# Patient Record
Sex: FEMALE | Race: WHITE | NOT HISPANIC OR LATINO | URBAN - METROPOLITAN AREA
[De-identification: names, ages, dates, MRNs, and addresses within clinical notes are randomized per-mention and may not be internally consistent; named-entity substitution may affect disease eponyms.]

---

## 2018-01-11 ENCOUNTER — CONVERSION ENCOUNTER (OUTPATIENT)
Dept: FAMILY MEDICINE CLINIC | Facility: CLINIC | Age: 48
End: 2018-01-11

## 2018-01-11 ENCOUNTER — OFFICE VISIT CONVERTED (OUTPATIENT)
Dept: FAMILY MEDICINE CLINIC | Facility: CLINIC | Age: 48
End: 2018-01-11
Attending: NURSE PRACTITIONER

## 2018-02-05 ENCOUNTER — CONVERSION ENCOUNTER (OUTPATIENT)
Dept: FAMILY MEDICINE CLINIC | Facility: CLINIC | Age: 48
End: 2018-02-05

## 2018-02-05 ENCOUNTER — OFFICE VISIT CONVERTED (OUTPATIENT)
Dept: FAMILY MEDICINE CLINIC | Facility: CLINIC | Age: 48
End: 2018-02-05
Attending: NURSE PRACTITIONER

## 2018-03-21 ENCOUNTER — OFFICE VISIT CONVERTED (OUTPATIENT)
Dept: FAMILY MEDICINE CLINIC | Facility: CLINIC | Age: 48
End: 2018-03-21
Attending: NURSE PRACTITIONER

## 2018-05-18 ENCOUNTER — OFFICE VISIT CONVERTED (OUTPATIENT)
Dept: FAMILY MEDICINE CLINIC | Facility: CLINIC | Age: 48
End: 2018-05-18
Attending: NURSE PRACTITIONER

## 2018-09-07 ENCOUNTER — OFFICE VISIT CONVERTED (OUTPATIENT)
Dept: FAMILY MEDICINE CLINIC | Facility: CLINIC | Age: 48
End: 2018-09-07
Attending: NURSE PRACTITIONER

## 2018-12-19 ENCOUNTER — CONVERSION ENCOUNTER (OUTPATIENT)
Dept: FAMILY MEDICINE CLINIC | Facility: CLINIC | Age: 48
End: 2018-12-19

## 2018-12-19 ENCOUNTER — OFFICE VISIT CONVERTED (OUTPATIENT)
Dept: FAMILY MEDICINE CLINIC | Facility: CLINIC | Age: 48
End: 2018-12-19
Attending: NURSE PRACTITIONER

## 2019-01-22 ENCOUNTER — OFFICE VISIT CONVERTED (OUTPATIENT)
Dept: FAMILY MEDICINE CLINIC | Facility: CLINIC | Age: 49
End: 2019-01-22
Attending: FAMILY MEDICINE

## 2019-01-22 ENCOUNTER — HOSPITAL ENCOUNTER (OUTPATIENT)
Dept: FAMILY MEDICINE CLINIC | Facility: CLINIC | Age: 49
Discharge: HOME | End: 2019-01-22

## 2019-01-22 LAB
FSH SERPL-ACNC: 6.2 M[IU]/ML
HCG SERPL-SCNC: NEGATIVE MMOL/L

## 2019-02-22 ENCOUNTER — OFFICE VISIT CONVERTED (OUTPATIENT)
Dept: FAMILY MEDICINE CLINIC | Facility: CLINIC | Age: 49
End: 2019-02-22
Attending: FAMILY MEDICINE

## 2019-05-17 ENCOUNTER — OFFICE VISIT CONVERTED (OUTPATIENT)
Dept: FAMILY MEDICINE CLINIC | Facility: CLINIC | Age: 49
End: 2019-05-17
Attending: FAMILY MEDICINE

## 2019-12-02 ENCOUNTER — OFFICE VISIT CONVERTED (OUTPATIENT)
Dept: FAMILY MEDICINE CLINIC | Facility: CLINIC | Age: 49
End: 2019-12-02
Attending: FAMILY MEDICINE

## 2019-12-02 ENCOUNTER — HOSPITAL ENCOUNTER (OUTPATIENT)
Dept: FAMILY MEDICINE CLINIC | Facility: CLINIC | Age: 49
Discharge: HOME | End: 2019-12-02
Attending: FAMILY MEDICINE

## 2019-12-04 LAB
AMOXICILLIN+CLAV SUSC ISLT: 8
AMPICILLIN SUSC ISLT: >=32
AMPICILLIN+SULBAC SUSC ISLT: 16
BACTERIA UR CULT: ABNORMAL
CEFAZOLIN SUSC ISLT: <=4
CEFEPIME SUSC ISLT: <=1
CEFTAZIDIME SUSC ISLT: <=1
CEFTRIAXONE SUSC ISLT: <=1
CEFUROXIME ORAL SUSC ISLT: 4
CEFUROXIME PARENTER SUSC ISLT: 4
CIPROFLOXACIN SUSC ISLT: >=4
ERTAPENEM SUSC ISLT: <=0.5
GENTAMICIN SUSC ISLT: >=16
LEVOFLOXACIN SUSC ISLT: >=8
NITROFURANTOIN SUSC ISLT: <=16
TETRACYCLINE SUSC ISLT: >=16
TMP SMX SUSC ISLT: >=320
TOBRAMYCIN SUSC ISLT: 8

## 2019-12-06 LAB
CONV ESTROGENS, TOTAL, SERUM: 193 PG/ML
FSH SERPL-ACNC: 7.7 M[IU]/ML
LH SERPL-ACNC: 5.1 M[IU]/ML
PROGEST SERPL-MCNC: 0.2 NG/ML

## 2020-07-27 ENCOUNTER — OFFICE VISIT CONVERTED (OUTPATIENT)
Dept: FAMILY MEDICINE CLINIC | Facility: CLINIC | Age: 50
End: 2020-07-27
Attending: FAMILY MEDICINE

## 2020-07-27 ENCOUNTER — HOSPITAL ENCOUNTER (OUTPATIENT)
Dept: FAMILY MEDICINE CLINIC | Facility: CLINIC | Age: 50
Discharge: HOME | End: 2020-07-27
Attending: FAMILY MEDICINE

## 2020-07-27 LAB
ALBUMIN SERPL-MCNC: 4.1 G/DL (ref 3.5–5)
ALBUMIN/GLOB SERPL: 1.4 {RATIO} (ref 1.4–2.6)
ALP SERPL-CCNC: 96 U/L (ref 42–98)
ALT SERPL-CCNC: 17 U/L (ref 10–40)
ANION GAP SERPL CALC-SCNC: 16 MMOL/L (ref 8–19)
AST SERPL-CCNC: 15 U/L (ref 15–50)
BASOPHILS # BLD AUTO: 0.03 10*3/UL (ref 0–0.2)
BASOPHILS # BLD: 0 % (ref 0–3)
BASOPHILS NFR BLD AUTO: 0.5 % (ref 0–3)
BILIRUB SERPL-MCNC: 0.18 MG/DL (ref 0.2–1.3)
BUN SERPL-MCNC: 14 MG/DL (ref 5–25)
BUN/CREAT SERPL: 16 {RATIO} (ref 6–20)
CALCIUM SERPL-MCNC: 9.2 MG/DL (ref 8.7–10.4)
CHLORIDE SERPL-SCNC: 100 MMOL/L (ref 99–111)
CHOLEST SERPL-MCNC: 214 MG/DL (ref 107–200)
CHOLEST/HDLC SERPL: 3.2 {RATIO} (ref 3–6)
CONV ABS BANDS: 0 % (ref 1–5)
CONV ABS IMM GRAN: 0.03 10*3/UL (ref 0–0.2)
CONV ATYPICAL LYMPHOCYTES: 6 % (ref 0–5)
CONV CO2: 26 MMOL/L (ref 22–32)
CONV IMMATURE GRAN: 0.5 % (ref 0–1.8)
CONV SEGMENTED NEUTROPHILS: 76 % (ref 45–70)
CONV TOTAL PROTEIN: 7 G/DL (ref 6.3–8.2)
CREAT UR-MCNC: 0.88 MG/DL (ref 0.5–0.9)
DEPRECATED RDW RBC AUTO: 42.6 FL (ref 36.4–46.3)
EOSINOPHIL # BLD AUTO: 0.04 10*3/UL (ref 0–0.7)
EOSINOPHIL # BLD AUTO: 0.7 % (ref 0–7)
EOSINOPHIL NFR BLD AUTO: 0 % (ref 0–7)
ERYTHROCYTE [DISTWIDTH] IN BLOOD BY AUTOMATED COUNT: 12.3 % (ref 11.7–14.4)
EST. AVERAGE GLUCOSE BLD GHB EST-MCNC: 97 MG/DL
GFR SERPLBLD BASED ON 1.73 SQ M-ARVRAT: >60 ML/MIN/{1.73_M2}
GLOBULIN UR ELPH-MCNC: 2.9 G/DL (ref 2–3.5)
GLUCOSE SERPL-MCNC: 94 MG/DL (ref 65–99)
HBA1C MFR BLD: 5 % (ref 3.5–5.7)
HCT VFR BLD AUTO: 38.7 % (ref 37–47)
HDLC SERPL-MCNC: 66 MG/DL (ref 40–60)
HGB BLD-MCNC: 12.2 G/DL (ref 12–16)
LDLC SERPL CALC-MCNC: 107 MG/DL (ref 70–100)
LYMPHOCYTES # BLD AUTO: 1.68 10*3/UL (ref 1–5)
LYMPHOCYTES NFR BLD AUTO: 27.9 % (ref 20–45)
MCH RBC QN AUTO: 30.2 PG (ref 27–31)
MCHC RBC AUTO-ENTMCNC: 31.5 G/DL (ref 33–37)
MCV RBC AUTO: 95.8 FL (ref 81–99)
MONOCYTES # BLD AUTO: 0.45 10*3/UL (ref 0.2–1.2)
MONOCYTES NFR BLD AUTO: 7.5 % (ref 3–10)
MONOCYTES NFR BLD MANUAL: 4 % (ref 3–10)
NEUTROPHILS # BLD AUTO: 3.79 10*3/UL (ref 2–8)
NEUTROPHILS NFR BLD AUTO: 62.9 % (ref 30–85)
NRBC CBCN: 0 % (ref 0–0.7)
NUC CELL # PRT MANUAL: 0 /100{WBCS}
OSMOLALITY SERPL CALC.SUM OF ELEC: 286 MOSM/KG (ref 273–304)
PLAT MORPH BLD: NORMAL
PLATELET # BLD AUTO: 215 10*3/UL (ref 130–400)
PMV BLD AUTO: 9.7 FL (ref 9.4–12.3)
POTASSIUM SERPL-SCNC: 4 MMOL/L (ref 3.5–5.3)
RBC # BLD AUTO: 4.04 10*6/UL (ref 4.2–5.4)
SMALL PLATELETS BLD QL SMEAR: ADEQUATE
SODIUM SERPL-SCNC: 138 MMOL/L (ref 135–147)
TRIGL SERPL-MCNC: 207 MG/DL (ref 40–150)
TSH SERPL-ACNC: 1.38 M[IU]/L (ref 0.27–4.2)
VARIANT LYMPHS NFR BLD MANUAL: 14 % (ref 20–45)
VLDLC SERPL-MCNC: 41 MG/DL (ref 5–37)
WBC # BLD AUTO: 6.02 10*3/UL (ref 4.8–10.8)

## 2021-05-07 NOTE — PROGRESS NOTES
"   Progress Note      Patient Name: Bria Montano   Patient ID: [id number]   Sex: Female   YOB: 1970        Visit Date: September 7, 2018    Provider: CHARLY Steel   Location: [facility]   Location Address: [address] [place] [address]  [id number]   Location Phone: [phone]          Chief Complaint     follow up on Iron  fatigue       History Of Present Illness  Bria Montano is a 48 year old /White female who presents for evaluation and treatment of:      follow up on labs from 3 months ago--she was noted to have a decreased RBC and her iron studies were low--she has been taking iron for the past 3 months--she needs to get her labs rechecked. She is still feeling fatigued, but not as bad as she was before. Her thyroid labs were okay in May. She wonders if some of the symptoms aren't related to pre-menopause. She is still having periods once per month, but \"they are going everywhere\". They seem to occur at the beginning, or at the end, or in between. They last about a week. The first 2 days are moderate, but not heavy, and then the other days are light.     She took some of the Lexapro, but she doesn't really like to take meds so she stopped it--she is using Plexus--things are better at work--things are getting better for her and --he is from [place] and he is trying to get permanent citizenship. She has worked out things with the friend that she was having trouble with as well.       Past Medical History  Disease Name Date Onset Notes   Anxiety --  --    Depression --  --    Seasonal allergies --  --          Past Surgical History  Procedure Name Date Notes   Sinus Surgery --  --    Tonsillectomy --  --          Medication List  Name Date Started Instructions   escitalopram oxalate 5 mg oral tablet 05/18/2018 take 1 tablet (5 mg) by oral route once daily   ferrous sulfate 325 mg (65 mg iron) oral tablet,delayed release (/EC) 05/29/2018 " take 1 tablet by oral route 2 times a day   montelukast 10 mg oral tablet 05/18/2018 take 1 tablet (10 mg) by oral route once daily in the evening         Allergy List  Allergen Name Date Reaction Notes   Antibiotic drug intolerance --  --  01/11/2018 - all antibiotics cause yeast infection   SULFA (SULFONAMIDES) --  --  --          Family Medical History  Disease Name Relative/Age Notes   Anemia / Mother    Mother/    Asthma / Mother    Mother/    Cerebrovascular Accident (CVA) / Mother    Mother/    Seizures / Sister    Sister/          Social History  Finding Status Start/Stop Quantity Notes   Alcohol Former --/-- --  drank alcohol in the past   Recreational Drug Use Never --/-- --  never used   Tobacco Never --/-- --  never smoker         Review of Systems  · Constitutional  o Admits  o : fatigue  · HENT  o Denies  o : headaches, vertigo, lightheadedness  · Cardiovascular  o Denies  o : chest pain, palpitations  · Respiratory  o Denies  o : cough  · Gastrointestinal  o Denies  o : nausea, vomiting, diarrhea, constipation, dysphagia, hematemesis, abdominal pain, blood in stools, hematochezia, melena  · Genitourinary  o Denies  o : hematuria, irregular menses, dysmenorrhea, menorrhagia  · Integument  o Denies  o : pigmentation changes  · Neurologic  o Denies  o : dizziness  · Endocrine  o Denies  o : cold intolerance, heat intolerance      Vitals  Date Time BP Position Site L\R Cuff Size HR RR TEMP(F) WT  HT  BMI kg/m2 BSA m2 O2 Sat HC       09/07/2018 09:16 /80 Sitting    95 - R  97.4 166lbs 0oz    99 %           Physical Examination  · Constitutional  o Appearance  o : well developed, well-nourished, in no acute distress  · Eyes  o Conjunctivae  o : conjunctivae normal, no exudates present  o Pupils and Irises  o : pupils equal and round, pupils reactive to light bilaterally  · Neck  o Inspection/Palpation  o : normal appearance, no masses or tenderness, trachea midline  o Thyroid  o : no  thyromegaly  · Respiratory  o Respiratory Effort  o : breathing unlabored  o Auscultation of Lungs  o : clear to ascultation  · Cardiovascular  o Heart  o :   § Auscultation of Heart  § : regular rate and rhythm  o Peripheral Vascular System  o :   § Extremities  § : no edema  · Gastrointestinal  o Abdominal Examination  o :   § Abdomen  § : soft, non-tender, non-distended, bowel sounds +  · Lymphatic  o Neck  o : no lymphadenopathy present  · Musculoskeletal  o General  o :   § General Musculoskeletal  § : Muscle tone, strength, and development grossly normal.  · Skin and Subcutaneous Tissue  o General Inspection  o : no lesions present, no areas of discoloration, skin turgor normal, texture normal  · Neurologic  o Gait and Station  o :   § Gait Screening  § : normal gait  · Psychiatric  o Mood and Affect  o : mood normal, affect appropriate              Assessment  · Fatigue     780.79/R53.83  · Iron deficiency     280.9/E61.1      Plan  · Orders  o Collection Fee for Venipuncture (96042) - - 09/07/2018  o CBC with Auto Diff [facility] (25830) - 280.9/E61.1 - 09/07/2018  o ACO-39: Current medications updated and reviewed () - - 09/07/2018  o Iron Profile (Iron 15890 TIBC 74914 and Transferrin 77131) (IRONP) - 280.9/E61.1 - 09/07/2018  o Ferritin ser/plas (29740) - 280.9/E61.1 - 09/07/2018  · Medications  o escitalopram oxalate 5 mg oral tablet   SIG: take 1 tablet (5 mg) by oral route once daily   DISP: (30) tablets with 0 refills  Discontinued on 09/07/2018     · Instructions  o Patient was educated/instructed on their diagnosis, treatment and medications prior to discharge from the clinic today. Her anxiety/depression are improved now that things at work are better--she does not want to continue any medication at this time. She is still fatigued--she did take the iron as prescribed--will recheck iron studies and if still low then we will refer to GI for further evaluation as she does not describe heavy menses.    · Disposition  o Call or Return if symptoms worsen or persist.            Electronically Signed by: CHARLY Steel -Author on September 7, 2018 09:43:22 AM

## 2021-05-07 NOTE — PROGRESS NOTES
Progress Note      Patient Name: Bria Deleon   Patient ID: [id number]   Sex: Female   YOB: 1970        Visit Date: December 2, 2019    Provider: Jonathon Moody DO   Location: [facility]   Location Address: [address] [place] [address] [place]  [id number]   Location Phone: [phone]          Chief Complaint     Refills and low back pain feels like UTI.       History Of Present Illness  Bria Deleon is a 49 year old /White female who presents for evaluation and treatment of:      1.) Depression/anxiety : The patient presents for follow up regarding anxiety and depression. She is currently prescribed Effexor and Buspar. She reports that she has not been taking the Buspar, as she felt as if the medication was not helping. She also attributes 'feeling cloudy' to the medication. She does admits to currently 'being very stressed', but is unclear regarding the source of her stress. Her PHQ-9 score is noted at 15.     2.) Dysuria : The patient presents with complaints of malodor of her urine. She denies any urinary frequency, burning with urination or hematuria. She does admit to back pain She denies fevers, chills. She denies pelvic pain.    3.) Perimenopause : The patient presents with complaints of what she assume are 'pre-menopausal sxs.' She reports being depressed, as well as poor concentration. She reports monthly menses, but then states that 'my period appears during different weeks.' She is unsure regarding the age of menopause of her mother. She 'thinks that her hormones are low' and is requesting hormone testing.       Past Medical History  Disease Name Date Onset Notes   Anxiety --  --    Depression --  --    Seasonal allergies --  --          Past Surgical History  Procedure Name Date Notes   Sinus Surgery --  --    Tonsillectomy --  --          Medication List  Name Date Started Instructions   biotin oral  --    buspirone 7.5 mg oral tablet 05/17/2019  take 1 tablet (7.5 mg) by oral route 2 times per day for 30 days   cetirizine 10 mg oral tablet  take 1 tablet (10 mg) by oral route once daily   Multivitamin 50 Plus oral tablet  take 1 tablet by oral route daily   venlafaxine 75 mg oral capsule,extended release 24hr 05/17/2019 take 1 capsule (75 mg) by oral route once daily with food for 30 days   vitamin B complex oral tablet  --    Vitamin C 1,000 mg oral tablet  take 1 tablet by oral route daily         Allergy List  Allergen Name Date Reaction Notes   Antibiotic drug intolerance --  --  01/11/2018 - all antibiotics cause yeast infection   SULFA (SULFONAMIDES) --  --  --          Family Medical History  Disease Name Relative/Age Notes   Anemia Mother/   Mother   Asthma Mother/   Mother   Seizures Sister/   Sister   Cerebrovascular Accident (CVA) Mother/   Mother         Social History  Finding Status Start/Stop Quantity Notes   Alcohol Former --/-- --  drank alcohol in the past   Recreational Drug Use Never --/-- --  never used   Tobacco Never --/-- --  never smoker         Review of Systems  · Constitutional  o Denies  o : fever, chills, night sweats  · Eyes  o Denies  o : eye pain, blurred vision  · HENT  o Denies  o : lightheadedness, nasal congestion  · Cardiovascular  o Denies  o : chest pain, syncope  · Respiratory  o Denies  o : shortness of breath, wheezing  · Gastrointestinal  o Denies  o : nausea, vomiting  · Genitourinary  o Admits  o : malodor of urine   o Denies  o : urgency, frequency, hematuria  · Integument  o Denies  o : rash, itching  · Neurologic  o Admits  o : difficulty concentrating  o Denies  o : altered mental status, tingling or numbness  · Musculoskeletal  o Admits  o : back pain  o Denies  o : muscular weakness, muscle cramps  · Endocrine  o Denies  o : thyroid disease, diabetes, heat or cold intolerance, excessive thirst or urination  · Psychiatric  o Admits  o : anxiety, depression  o Denies  o : hallucinations, delusions, feeling  confused  · Heme-Lymph  o Denies  o : petechiae, purpura      Vitals  Date Time BP Position Site L\R Cuff Size HR RR TEMP (F) WT  HT  BMI kg/m2 BSA m2 O2 Sat        12/02/2019 11:38 /78 Sitting    117 - R  97.4 187lbs 8oz    97 %          Physical Examination  · Constitutional  o Appearance  o : well developed, well-nourished, in no acute distress  · Head and Face  o HEENT  o : Unremarkable  · Eyes  o Conjunctivae  o : conjunctivae normal  o Pupils and Irises  o : pupils equal and round  · Neck  o Inspection/Palpation  o : supple  · Respiratory  o Respiratory Effort  o : breathing unlabored  o Auscultation of Lungs  o : clear to ascultation  · Cardiovascular  o Heart  o :   § Auscultation of Heart  § : regular rate and rhythm  o Peripheral Vascular System  o :   § Extremities  § : no edema  · Musculoskeletal  o General  o :   § General Musculoskeletal  § : No joint swelling or deformity.  · Skin and Subcutaneous Tissue  o General Inspection  o : no rashes present  · Neurologic  o Gait and Station  o :   § Gait Screening  § : normal gait  · Psychiatric  o Mood and Affect  o : mood normal, affect appropriate              Assessment  · Premenopausal patient     627.2/N95.9    A.) Hormone panel as noted below, will inform the patient of the results.     · Dysuria     788.1/R30.0    A.) See below.     · Depression     311/F32.9    A.) Will increase dose of Effexor, will discontinue Buspar as the patient has not been taking the medication.     · UTI (urinary tract infection)     599.0/N39.0    A.) UA + for nitrites, will start abx and will send for culture; if failed abx therapy, will refer to culture.         Plan  · Orders  o IOP - Urinalysis without Microscopy (Clinitek) [facility] (07423) - 788.1/R30.0 - 12/02/2019   GLU NEG SHELBI NEG KET NEG SG 1.020 BLO MODERATE PH 6.0 PRO NEG URO 0.2 NIT POSITIVE GELY NEG  o Urine culture (65284, 47863) - 788.1/R30.0 - 12/02/2019  o ACO-18: Positive screen for clinical depression  using a standardized tool and a follow-up plan documented () - 311/F32.9 - 12/02/2019   PHQ-9 = 15  o ACO-39: Current medications updated and reviewed () - - 12/02/2019  o Hormone Panel (Estrogen, FSH, LH, Progesterone) [facility] ([id number], 79764, 81840, 32917) - 627.2/N95.9 - 12/02/2019  · Medications  o Macrobid 100 mg oral capsule   SIG: take 1 capsule (100 mg) by oral route every 12 hours with food for 7 days   DISP: (14) capsules with 0 refills  Prescribed on 12/02/2019     o venlafaxine 150 mg oral tablet extended release 24hr   SIG: take 1 tablet (150 mg) by oral route once daily in the morning at the same time each day with food for 90 days   DISP: (90) tablets with 0 refills  Adjusted on 12/02/2019     o buspirone 7.5 mg oral tablet   SIG: take 1 tablet (7.5 mg) by oral route 2 times per day for 30 days   DISP: (60) tablets with 2 refills  Discontinued on 12/02/2019     o Medications have been Reconciled  o Transition of Care or Provider Policy  · Instructions  o Take all medications as prescribed/directed.  o Patient was educated/instructed on their diagnosis, treatment and medications prior to discharge from the clinic today.  o Return in 3 months.             Electronically Signed by: Jonathon Moody DO - on December 2, 2019 12:41:38 PM

## 2021-05-07 NOTE — PROGRESS NOTES
Progress Note      Patient Name: Bria Deleon   Patient ID: [id number]   Sex: Female   YOB: 1970        Visit Date: February 22, 2019    Provider: Arti Lima MD   Location: [facility]   Location Address: [address] [place] [address]  [id number]   Location Phone: [phone]          Chief Complaint     Follow up to discuss venlafaxine       History Of Present Illness  Bria Deleon is a 49 year old /White female who presents for evaluation and treatment of:      She thinks the medicine has helped considerably and she would like to increase the dose a little.  She is concerned about her  because he has a knot on his neck       Past Medical History  Disease Name Date Onset Notes   Anxiety --  --    Depression --  --    Seasonal allergies --  --          Past Surgical History  Procedure Name Date Notes   Sinus Surgery --  --    Tonsillectomy --  --          Medication List  Name Date Started Instructions   biotin oral  --    venlafaxine 75 mg oral capsule,extended release 24hr 01/22/2019 take 1 capsule (75 mg) by oral route once daily with food for 30 days   vitamin B complex oral tablet  --          Allergy List  Allergen Name Date Reaction Notes   Antibiotic drug intolerance --  --  01/11/2018 - all antibiotics cause yeast infection   SULFA (SULFONAMIDES) --  --  --          Family Medical History  Disease Name Relative/Age Notes   Anemia / Mother    Mother/    Asthma / Mother    Mother/    Cerebrovascular Accident (CVA) / Mother    Mother/    Seizures / Sister    Sister/          Social History  Finding Status Start/Stop Quantity Notes   Alcohol Former --/-- --  drank alcohol in the past   Recreational Drug Use Never --/-- --  never used   Tobacco Never --/-- --  never smoker         Vitals  Date Time BP Position Site L\R Cuff Size HR RR TEMP(F) WT  HT  BMI kg/m2 BSA m2 O2 Sat HC       02/22/2019 02:39 /78 Sitting    113 - R   98.4 173lbs 0oz    98 %           Physical Examination  · Constitutional  o Appearance  o : well developed, well-nourished, in no acute distress  · Eyes  o Conjunctivae  o : conjunctivae normal  · Neck  o Thyroid  o : no thyromegaly  · Respiratory  o Respiratory Effort  o : breathing unlabored  · Cardiovascular  o Heart  o :   § Auscultation of Heart  § : regular rate and rhythm  o Peripheral Vascular System  o :   § Extremities  § : no edema  · Lymphatic  o Neck  o : no lymphadenopathy present  · Musculoskeletal  o General  o :   § General Musculoskeletal  § : grossly normal.  · Skin and Subcutaneous Tissue  o General Inspection  o : normal  · Neurologic  o Gait and Station  o :   § Gait Screening  § : normal gait  · Psychiatric  o Mood and Affect  o : mood normal, affect appropriate she is smiling and looks good She is a little anxious about her           Assessment  · Anxiety disorder     300.00/F41.9  · Major depressive disorder     296.20/F32.2      Plan  · Orders  o ACO-39: Current medications updated and reviewed () - - 02/22/2019  · Medications  o venlafaxine 150 mg oral tablet extended release 24hr   SIG: take 1 tablet (150 mg) by oral route once daily in the morning at the same time each day with food for 30 days   DISP: (30) tablets with 3 refills  Adjusted on 02/22/2019     · Instructions  o Patient was educated/instructed on their diagnosis, treatment and medications prior to discharge from the clinic today.            Electronically Signed by: Arti Lima MD -Author on February 22, 2019 05:15:45 PM

## 2021-05-07 NOTE — PROGRESS NOTES
Progress Note      Patient Name: Bria Deleon   Patient ID: [id number]   Sex: Female   YOB: 1970        Visit Date: May 17, 2019    Provider: Arti Lima MD   Location: [facility]   Location Address: [address] [place] [address]  [id number]   Location Phone: [phone]          Chief Complaint     Discuss anxiety medication that she is currently taking  Patient wants to talk about starting a medication to help her focus       History Of Present Illness  Bria Deleon is a 49 year old /White female who presents for evaluation and treatment of:      She is getting more anxiety.  She is on Venlafaxine 75mg  and when we added the 37.5 she felt awful and tired.  She was on Buspar years ago.  She gets palpitations and has problems focusing.  She has had side effects if she doesn't take the Venlafaxine at the same time. She would like to try Adderall because her son does well on it.  She thinks she needs the smart pill.  Her problem with focusing is when she is doing bills, or at work.  She has always had trouble with focusing.  It has been a long time since she has seen a psychologist.  She does not have any history of heart problems.  She has anxiety.  She is planning a big wedding but she doesn't have anxiety in regards to that.  She does fear losing her .  She is happy now.  She does better since she started Plexis.  She had trouble in school .  She was hyperactive when she was little also.  She is overwhelmed.  She stays tired and sleeps a lot.  She can't stand the noise of the [employer] beeping when she goes backwords.  Noise sets her on edge. She realizes that she takes things too personally       Past Medical History  Disease Name Date Onset Notes   Anxiety --  --    Depression --  --    Seasonal allergies --  --          Past Surgical History  Procedure Name Date Notes   Sinus Surgery --  --    Tonsillectomy --  --          Medication  List  Name Date Started Instructions   biotin oral  --    venlafaxine 75 mg oral capsule,extended release 24hr 05/17/2019 take 1 capsule (75 mg) by oral route once daily with food for 30 days   vitamin B complex oral tablet  --          Allergy List  Allergen Name Date Reaction Notes   Antibiotic drug intolerance --  --  01/11/2018 - all antibiotics cause yeast infection   SULFA (SULFONAMIDES) --  --  --          Family Medical History  Disease Name Relative/Age Notes   Anemia Mother/   Mother   Asthma Mother/   Mother   Seizures Sister/   Sister   Cerebrovascular Accident (CVA) Mother/   Mother         Social History  Finding Status Start/Stop Quantity Notes   Alcohol Former --/-- --  drank alcohol in the past   Recreational Drug Use Never --/-- --  never used   Tobacco Never --/-- --  never smoker         Vitals  Date Time BP Position Site L\R Cuff Size HR RR TEMP (F) WT  HT  BMI kg/m2 BSA m2 O2 Sat HC       05/17/2019 04:33 /64 Sitting    111 - R  96 174lbs 4oz    96 %          Physical Examination  · Constitutional  o Appearance  o : well developed, well-nourished, in no acute distress  · Eyes  o Conjunctivae  o : conjunctivae normal  · Neck  o Thyroid  o : no thyromegaly  · Respiratory  o Respiratory Effort  o : breathing unlabored  o Auscultation of Lungs  o : clear to ascultation  · Cardiovascular  o Heart  o :   § Auscultation of Heart  § : regular rate and rhythm  o Peripheral Vascular System  o :   § Extremities  § : no edema  · Musculoskeletal  o General  o :   § General Musculoskeletal  § : Muscle tone, strength, and development grossly normal.  · Skin and Subcutaneous Tissue  o General Inspection  o : normal  · Neurologic  o Gait and Station  o :   § Gait Screening  § : normal gait  · Psychiatric  o Mood and Affect  o : fidgety but yawns occasionally.          Assessment  · Anxiety disorder     300.00/F41.9      Plan  · Orders  o ACO-39: Current medications updated and reviewed () - -  05/17/2019  · Medications  o buspirone 7.5 mg oral tablet   SIG: take 1 tablet (7.5 mg) by oral route 2 times per day for 30 days   DISP: (60) tablets with 3 refills  Prescribed on 05/17/2019     o venlafaxine 75 mg oral capsule,extended release 24hr   SIG: take 1 capsule (75 mg) by oral route once daily with food for 30 days   DISP: (30) capsules with 5 refills  Adjusted on 05/17/2019     · Instructions  o Patient was educated/instructed on their diagnosis, treatment and medications prior to discharge from the clinic today.            Electronically Signed by: Arti Lima MD -Author on May 17, 2019 05:20:50 PM

## 2021-05-07 NOTE — PROGRESS NOTES
Progress Note      Patient Name: Bria Montano   Patient ID: [id number]   Sex: Female   YOB: 1970        Visit Date: February 5, 2018    Provider: CHARLY Steel   Location: [facility]   Location Address: [address] [place] [address]  [id number]   Location Phone: [phone]          Chief Complaint     ITCHING AND BURNING WITH URINATION       History Of Present Illness  Bria Montano is a 48 year old /White female who presents for evaluation and treatment of:      vaginal itching and burning with urination. Has been ongoing for the past few days. Denies any vaginal discharge. No gross hematuria. No abdominal pain or lower back pain. No nausea, vomiting, or diarrhea. Denies fever or chills.       Past Medical History  Disease Name Date Onset Notes   Anxiety --  --    Depression --  --    Seasonal allergies --  --          Past Surgical History  Procedure Name Date Notes   Sinus Surgery --  --    Tonsillectomy --  --          Medication List  Name Date Started Instructions   Ventolin HFA 90 mcg/actuation inhalation HFA aerosol inhaler 01/11/2018 inhale 1 - 2 puffs (90 - 180 mcg) by inhalation route every 4-6 hours as needed for 10 days         Allergy List  Allergen Name Date Reaction Notes   Antibiotic drug intolerance --  --  01/11/2018 - all antibiotics cause yeast infection   SULFA (SULFONAMIDES) --  --  --          Family Medical History  Disease Name Relative/Age Notes   Anemia / Mother    Mother/    Asthma / Mother    Mother/    Cerebrovascular Accident (CVA) / Mother    Mother/    Seizures / Sister    Sister/          Social History  Finding Status Start/Stop Quantity Notes   Alcohol Former --/-- --  drank alcohol in the past   Recreational Drug Use Never --/-- --  never used   Tobacco Never --/-- --  never smoker         Review of Systems  · Constitutional  o Denies  o : fever, chills  · Cardiovascular  o Denies  o : chest pain, irregular  heart beats, syncope, dyspnea on exertion  · Respiratory  o Denies  o : shortness of breath, wheezing, cough  · Gastrointestinal  o Denies  o : nausea, vomiting, diarrhea, abdominal pain  · Genitourinary  o Admits  o : dysuria, vaginal itching  o Denies  o : urgency, frequency, hematuria, change in urine color, urinary retention, vaginal discharge  · Integument  o Denies  o : rash  · Psychiatric  o Denies  o : anxiety, depression, difficulty sleeping, suicidal ideation, homicidal ideation      Vitals  Date Time BP Position Site L\R Cuff Size HR RR TEMP(F) WT  HT  BMI kg/m2 BSA m2 O2 Sat HC       02/05/2018 01:01 /80 Sitting    125 - R  98.1 165lbs 0oz    100 %           Physical Examination  · Constitutional  o Appearance  o : well developed, well-nourished, in no acute distress  · Respiratory  o Respiratory Effort  o : breathing unlabored  o Auscultation of Lungs  o : clear to ascultation  · Cardiovascular  o Heart  o :   § Auscultation of Heart  § : regular rate and rhythm  o Peripheral Vascular System  o :   § Extremities  § : no edema  · Genitourinary  o External Genitalia  o : no inflammation, no lesions present, no masses present, no evidence of trauma  · Lymphatic  o Neck  o : no lymphadenopathy present  · Musculoskeletal  o General  o :   § General Musculoskeletal  § : No joint swelling or deformity., Muscle tone, strength, and development grossly normal.  · Skin and Subcutaneous Tissue  o General Inspection  o : no lesions present, no areas of discoloration, skin turgor normal, texture normal  · Neurologic  o Gait and Station  o :   § Gait Screening  § : normal gait  · Psychiatric  o Mood and Affect  o : mood normal, affect appropriate              Assessment  · Vaginal itching     698.1/L29.8  · Dysuria     788.1/R30.0      Plan  · Orders  o IOP - Urinalysis without Microscopy (Clinitek) [facility] ([id number]) - 788.1/R30.0 - 02/06/2018   glu neg, marilynn neg, ket neg, sg 1.015, blo neg, ph 7.5, pro neg, uro 0.2,  nit neg, adam trace  o ACO-39: Current medications updated and reviewed () - - 02/05/2018  o ACO-18: Screening for clinical depression not completed due to current diagnosis of depression or bipolar disorder () - - 02/05/2018  o Influenza immunization was not ordered or administered for reasons documented by clinician () - - 02/05/2018   Up to date; out of stock  o Vag Screen Infectious agent detection by nucleic acid DNA or RNA ([id number]) - 698.1/L29.8 - 02/05/2018  · Medications  o fluconazole 150 mg oral tablet   SIG: take 1 tablet (150 mg) by oral route once; may repeat in 3 days   DISP: (2) tablets with 0 refills  Prescribed on 02/05/2018     · Instructions  o Patient was educated/instructed on their diagnosis, treatment and medications prior to discharge from the clinic today. Her UA showed trace leukocytes, but was otherwise normal. Vaginal swab for yeast obtained--will also check for BV. Will call results, but in the interim, will treat with Fluconazole x2.   · Disposition  o Call or Return if symptoms worsen or persist.            Electronically Signed by: CHARLY Steel -Author on February 9, 2018 09:24:13 AM

## 2021-05-07 NOTE — PROGRESS NOTES
"   Progress Note      Patient Name: Bria Deleon   Patient ID: [id number]   Sex: Female   YOB: 1970        Visit Date: December 19, 2018    Provider: CHARLY Steel   Location: [facility]   Location Address: [address] [place] [address]  [id number]   Location Phone: [phone]          Chief Complaint     depression, trouble with anxiety  follow up on iron       History Of Present Illness  Bria Deleon is a 48 year old /White female who presents for evaluation and treatment of:      follow up on iron--    She finished her RX about 2 weeks ago. she feels like the iron is causing her Plexus not to work. She thinks that is why she feels bad. She feels like the iron is causing her constipation and the constipation causes her not to absorb the vitamins in her Plexus. She isn't being \"cleansed\". When she has a clean gut she feels better.     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 She has to be very careful and not \"hurt the pipes\" and she is having a hard time concentrating. She feels like she can't learn or  things because she is so anxious/overwhelmed with the position. She denies any past history of ADHD. She did have a learning disorder with reading comprehension.     She is tearful today.     She has been given Lexapro 5mg in the past, but she didn't really take it. She states when she did take it, it made her feel \"not alert\". She states with her job she needs to feel \"alert\". She can't feel tired and she has to be able to focus. Her son has been put on Adderall and she told him that it changed his life. She wants to be on that; she wants to try it.     She is currently using Plexus products--she does a biocleanse " in the morning, with B complex, B6, Biotin. She takes Zantac OTC and Amberen that she [facility] O[facility]. At night she takes Slim, Probiotic, and another Amberen.    She wants checked for a UTI as well. She is having burning with urination.       Past Medical History  Disease Name Date Onset Notes   Anxiety --  --    Depression --  --    Seasonal allergies --  --          Past Surgical History  Procedure Name Date Notes   Sinus Surgery --  --    Tonsillectomy --  --          Medication List  Name Date Started Instructions   biotin oral  --    vitamin B complex oral tablet  --          Allergy List  Allergen Name Date Reaction Notes   Antibiotic drug intolerance --  --  01/11/2018 - all antibiotics cause yeast infection   SULFA (SULFONAMIDES) --  --  --          Family Medical History  Disease Name Relative/Age Notes   Anemia / Mother    Mother/    Asthma / Mother    Mother/    Cerebrovascular Accident (CVA) / Mother    Mother/    Seizures / Sister    Sister/          Social History  Finding Status Start/Stop Quantity Notes   Alcohol Former --/-- --  drank alcohol in the past   Recreational Drug Use Never --/-- --  never used   Tobacco Never --/-- --  never smoker         Review of Systems  · Constitutional  o Admits  o : fatigue  o Denies  o : fever, chills  · Eyes  o Denies  o : changes in vision  · HENT  o Admits  o : headaches, neck stiffness  · Cardiovascular  o Denies  o : chest pain, dyspnea on exertion, lower extremity edema, palpitations  · Respiratory  o Denies  o : shortness of breath, cough  · Gastrointestinal  o Denies  o : nausea, vomiting, diarrhea, hematemesis, abdominal pain, blood in stools, hematochezia, melena  · Genitourinary  o Admits  o : dysuria, vaginal itching  o Denies  o : urinary retention, irregular menses, vaginal discharge  · Integument  o Denies  o : pigmentation changes  · Neurologic  o Admits  o : difficulty concentrating, memory difficulties  · Musculoskeletal  o Admits  o : back  pain  o Denies  o : joint pain, joint swelling  · Endocrine  o Admits  o : hot flashes  o Denies  o : cold intolerance, heat intolerance  · Psychiatric  o Admits  o : anxiety, depression  o Denies  o : difficulty sleeping, suicidal ideation, homicidal ideation      Vitals  Date Time BP Position Site L\R Cuff Size HR RR TEMP(F) WT  HT  BMI kg/m2 BSA m2 O2 Sat HC       12/19/2018 01:10 /70 Sitting    118 - R  98.2 170lbs 0oz               Physical Examination  · Constitutional  o Appearance  o : well developed, well-nourished, in no acute distress  · Neck  o Inspection/Palpation  o : normal appearance, no masses or tenderness, trachea midline  o Thyroid  o : no thyromegaly  · Respiratory  o Respiratory Effort  o : breathing unlabored  o Auscultation of Lungs  o : clear to ascultation  · Cardiovascular  o Heart  o :   § Auscultation of Heart  § : regular rate and rhythm  o Peripheral Vascular System  o :   § Extremities  § : no edema  · Gastrointestinal  o Abdominal Examination  o :   § Abdomen  § : soft, non-tender, non-distended, bowel sounds +  · Lymphatic  o Neck  o : no lymphadenopathy present  · Musculoskeletal  o General  o :   § General Musculoskeletal  § : Muscle tone, strength, and development grossly normal.  · Skin and Subcutaneous Tissue  o General Inspection  o : no lesions present, no areas of discoloration, skin turgor normal, texture normal  · Neurologic  o Gait and Station  o :   § Gait Screening  § : normal gait  · Psychiatric  o Mood and Affect  o : tearful at times, anxious, and seems depressed              Assessment  · Iron deficiency     280.9/E61.1  · Drug-induced constipation       Drug induced constipation     564.09/K59.03  secondary to iron  · Difficulty concentrating     799.51/R41.840  · Anxiety     300.00/F41.9  · Depression     311/F32.9  · Dysuria     788.1/R30.0  · Vaginal itching     698.1/N89.8  · Microscopic hematuria     599.72/R31.29      Plan  · Orders  o CBC with Auto  Diff [facility] ([id number]) - 280.9/E61.1 - 12/19/2018  o IOP - Urinalysis without Microscopy (Clinitek) [facility] ([id number]) - 788.1/R30.0, 698.1/N89.8 - 12/19/2018   Glu negative, Nabeel negative, Ket trace, SG 1.020, Blood large, pH 5.5, Pro negative, Uro 0.2, Nit negative, Leuk trace  o Urine culture (72110, 50945) - 788.1/R30.0, 599.72/R31.29 - 12/19/2018  o ACO-39: Current medications updated and reviewed () - - 12/19/2018  o Iron Profile (Iron 32448 TIBC 43111 and Transferrin 12593) (IRONP) - 280.9/E61.1 - 12/19/2018  o Ferritin ser/plas (31369) - 280.9/E61.1 - 12/19/2018  o PSYCHIATRY CONSULTATION (PSYCH) - 799.51/R41.840, 300.00/F41.9, 311/F32.9 - 12/19/2018   handout provided for patient to call and make appt. BV  o HEMATOLOGY/ONCOLOGY CONSULTATION (HEMOC) - 280.9/E61.1, 564.09/K59.03 - 12/19/2018   intolerant to oral iron  · Medications  o venlafaxine 37.5 mg oral capsule,extended release 24hr   SIG: take 1 capsule (37.5 mg) by oral route once daily with food   DISP: (30) capsules with 0 refills  Prescribed on 12/19/2018     o Macrobid 100 mg oral capsule   SIG: take 1 capsule (100 mg) by oral route every 12 hours with food for 5 days   DISP: (10) capsules with 0 refills  Prescribed on 12/19/2018     o fluconazole 150 mg oral tablet   SIG: take 1 tablet (150 mg) by oral route once. May repeat in 1 week, if needed.   DISP: (2) tablets with 0 refills  Prescribed on 12/19/2018     · Instructions  o Take all medications as prescribed/directed.  o Patient was educated/instructed on their diagnosis, treatment and medications prior to discharge from the clinic today.  o Chronic conditions reviewed and taken into consideration for today's treatment plan.  · Disposition  o Call or Return if symptoms worsen or persist.            Electronically Signed by: CHARLY Steel -Author on December 19, 2018 02:37:31 PM

## 2021-05-07 NOTE — PROGRESS NOTES
Progress Note      Patient Name: Bria Montano   Patient ID: [id number]   Sex: Female   YOB: 1970        Visit Date: January 11, 2018    Provider: CHARLY Steel   Location: [facility]   Location Address: [address] [place] [address]  [id number]   Location Phone: [phone]          Chief Complaint     cough and congestion  was told at urgent care she has asthma       History Of Present Illness  Bria Montano is a 47 year old /White female who presents for evaluation and treatment of:      possible asthma, and cough.     She went urgent care in [facility] on Monday--went with cough and nasal congestion--this was her second time there (the first time was at [place] and they gave her a Zpak)--this time they gave her Doxycycline and Diflucan and told her that she had asthma. She was not given any inhalers. Since Monday she feels like she is approximately 75% better.     She denies every having asthma as a child. She is a non-smoker; never a smoker. 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       Past Medical History  Disease Name Date Onset Notes   Anxiety --  --    Depression --  --    Seasonal allergies --  --          Past Surgical History  Procedure Name Date Notes   Sinus Surgery --  --    Tonsillectomy --  --          Allergy List  Allergen Name Date Reaction Notes   Antibiotic drug intolerance --  --  01/11/2018 - all antibiotics cause yeast infection   SULFA (SULFONAMIDES) --  --  --          Family Medical History  Disease Name Relative/Age Notes   Anemia / Mother    Mother/    Asthma / Mother    Mother/    Cerebrovascular Accident (CVA) / Mother    Mother/    Seizures / Sister    Sister/          Social History  Finding Status Start/Stop Quantity Notes   Alcohol Former --/-- --  drank alcohol in the past   Recreational  "Drug Use Never --/-- --  never used   Tobacco Never --/-- --  never smoker         Review of Systems  · Constitutional  o Admits  o : fatigue  o Denies  o : fever, chills  · Eyes  o Denies  o : double vision, blurred vision  · HENT  o Admits  o : nasal congestion, nasal discharge, ear fullness, hoarseness  o Denies  o : headaches, sinus pain, sore throat, tinnitus, ear pain  · Cardiovascular  o Denies  o : chest pain, irregular heart beats, syncope, dyspnea on exertion  · Respiratory  o Admits  o : wheezing (with laying down, initially, but not now), productive cough,  o Denies  o : shortness of breath, smoking  · Gastrointestinal  o Denies  o : nausea, vomiting, diarrhea, constipation, abdominal pain  · Integument  o Denies  o : rash  · Musculoskeletal  o Denies  o : joint pain, joint swelling  · Psychiatric  o Denies  o : anxiety, depression, suicidal ideation, homicidal ideation  · Allergic-Immunologic  o Denies  o : sinus allergy symptoms, frequent illnesses      Vitals  Date Time BP Position Site L\R Cuff Size HR RR TEMP(F) WT  HT  BMI kg/m2 BSA m2 O2 Sat        01/11/2018 02:00 /80 Sitting    125 - R  98.6 165lbs 0oz 5'  6.5\" 26.23 1.87 98 %           Physical Examination  · Constitutional  o Appearance  o : well developed, well-nourished, in no acute distress  · Eyes  o Conjunctivae  o : conjunctivae normal, no exudates present  o Pupils and Irises  o : pupils equal and round, pupils reactive to light bilaterally  · Ears, Nose, Mouth and Throat  o Ears  o :   § External Ears  § : auricle appearance normal bilaterally, no auricle tenderness to palpation present, external auditory canal appearance within normal limits  § Otoscopic Examination  § : tympanic membrane appearance within normal limits bilaterally  § Hearing  § : response to sound normal, no tinnitus  o Nose  o :   § External Nose  § : appearance normal  § Intranasal Exam  § : mucosa within normal limits  o Throat  o :   § Oropharynx  § : no " inflammation or lesions present, tonsils within normal limits  · Neck  o Inspection/Palpation  o : normal appearance, no masses or tenderness, trachea midline  · Respiratory  o Respiratory Effort  o : breathing unlabored  o Auscultation of Lungs  o : clear to ascultation  · Cardiovascular  o Heart  o :   § Auscultation of Heart  § : regular rate and rhythm  o Peripheral Vascular System  o :   § Extremities  § : no edema  · Lymphatic  o Neck  o : no lymphadenopathy present  · Musculoskeletal  o General  o :   § General Musculoskeletal  § : No joint swelling or deformity., Muscle tone, strength, and development grossly normal.  · Skin and Subcutaneous Tissue  o General Inspection  o : no lesions present, no areas of discoloration, skin turgor normal, texture normal  · Neurologic  o Gait and Station  o :   § Gait Screening  § : normal gait  · Psychiatric  o Mood and Affect  o : mood normal, affect appropriate              Assessment  · Bronchitis, acute     466.0/J20.9  · Chronic coughing     786.2/R05  · Wheezing     786.07/R06.2  · Influenza vaccine refused     V64.06/Z28.21      Plan  · Orders  o ACO-39: Current medications updated and reviewed () - - 01/11/2018  o ACO-18: Screening for clinical depression not completed due to current diagnosis of depression or bipolar disorder () - - 01/11/2018   hx of depression and anxiety  o Influenza immunization was not ordered or administered for reasons documented by clinician () - V64.06/Z28.21 - 01/11/2018  o Spirometry In-Office Only [facility] ([id number]) - 786.2/R05, 786.07/R06.2 - 01/11/2018   Normal  · Medications  o Medrol (Refugio) 4 mg oral tablets,dose pack   SIG: take as directed for 6 days   DISP: (1) 21 ct dose-pack with 0 refills  Prescribed on 01/11/2018     o Ventolin HFA 90 mcg/actuation inhalation HFA aerosol inhaler   SIG: inhale 1 - 2 puffs (90 - 180 mcg) by inhalation route every 4-6 hours as needed for 10 days   DISP: (1) 18 gm canister with 0  refills  Prescribed on 01/11/2018     · Instructions  o Take all medications as prescribed/directed.  o Rest. Increase Fluids.  o Patient was educated/instructed on their diagnosis, treatment and medications prior to discharge from the clinic today. If her symptoms do not resolve, she will RTC.   · Disposition  o Call or Return if symptoms worsen or persist.            Electronically Signed by: CHARLY Steel -Author on January 11, 2018 02:35:43 PM

## 2021-05-07 NOTE — PROGRESS NOTES
Progress Note      Patient Name: Bria Montano   Patient ID: [id number]   Sex: Female   YOB: 1970        Visit Date: March 21, 2018    Provider: CHARLY Cruz   Location: [facility]   Location Address: [address] [place] [address]  [id number]   Location Phone: [phone]          Chief Complaint     left ear pain, glands on left side swollen, and coughing, started this morning       History Of Present Illness  Bria Montano is a 48 year old /White female who presents for evaluation and treatment of:      Left ear pain, swollen left lymph nodes, coughing, chills that started at 1am this morning - sore throat - PND - yellowish brown drainage.   Did not take any OTC medications.   She just started a new job and can only have 4 sick days/year or is terminated.   Her significant other is also sick.       Past Medical History  Disease Name Date Onset Notes   Anxiety --  --    Depression --  --    Seasonal allergies --  --          Past Surgical History  Procedure Name Date Notes   Sinus Surgery --  --    Tonsillectomy --  --          Medication List  Name Date Started Instructions   Ventolin HFA 90 mcg/actuation inhalation HFA aerosol inhaler 01/11/2018 inhale 1 - 2 puffs (90 - 180 mcg) by inhalation route every 4-6 hours as needed for 10 days         Allergy List  Allergen Name Date Reaction Notes   Antibiotic drug intolerance --  --  01/11/2018 - all antibiotics cause yeast infection   SULFA (SULFONAMIDES) --  --  --          Family Medical History  Disease Name Relative/Age Notes   Anemia / Mother    Mother/    Asthma / Mother    Mother/    Cerebrovascular Accident (CVA) / Mother    Mother/    Seizures / Sister    Sister/          Social History  Finding Status Start/Stop Quantity Notes   Alcohol Former --/-- --  drank alcohol in the past   Recreational Drug Use Never --/-- --  never used   Tobacco Never --/-- --  never smoker         Review of  "Systems  · HENT  o * See HPI  · Respiratory  o * See HPI  · Gastrointestinal  o Denies  o : nausea, vomiting, diarrhea      Vitals  Date Time BP Position Site L\R Cuff Size HR RR TEMP(F) WT  HT  BMI kg/m2 BSA m2 O2 Sat HC       03/21/2018 11:38 /68 Sitting    105 - R  98.2 164lbs 8oz 5'  6.5\" 26.15 1.87 99 %           Physical Examination  · Constitutional  o Appearance  o : well developed, well-nourished, in no acute distress  · Eyes  o Conjunctivae  o : conjunctivae normal  o Pupils and Irises  o : pupils equal and round, pupils reactive to light bilaterally  · Ears, Nose, Mouth and Throat  o Ears  o :   § External Ears  § : no auricle tenderness to palpation present  § Otoscopic Examination  § : tympanic membrane appearance within normal limits bilaterally, no tympanic membrane lesions present  o Throat  o :   § Oropharynx  § : no inflammation or lesions present  · Neck  o Inspection/Palpation  o : supple  o Thyroid  o : no thyromegaly  · Respiratory  o Respiratory Effort  o : breathing unlabored  o Auscultation of Lungs  o : clear to ascultation  · Cardiovascular  o Heart  o :   § Auscultation of Heart  § : regular rate and rhythm  o Peripheral Vascular System  o :   § Extremities  § : no edema  · Lymphatic  o Neck  o : no lymphadenopathy present  · Musculoskeletal  o General  o :   § General Musculoskeletal  § : Muscle tone, strength, and development grossly normal.  · Skin and Subcutaneous Tissue  o General Inspection  o : NL tone  · Neurologic  o Gait and Station  o :   § Gait Screening  § : normal gait  · Psychiatric  o Mood and Affect  o : mood normal, affect appropriate          Assessment  · Chills     780.64/R68.83  · Viral illness     079.99/B34.9      Plan  · Orders  o ACO-39: Current medications updated and reviewed () - - 03/21/2018  o IOP - Rapid Strep (45964) - 780.64/R68.83 - 03/21/2018   strep-negative  · Instructions  o Rest. Increase Fluids.  o Patient was educated/instructed on " "their diagnosis, treatment and medications prior to discharge from the clinic today.  o Viral illness - OTC symptom management.   o Declined to write \"prescription\" for pt - pt became irritated stating she has the same thing her boyfriend has and his doctor gave him a prescription - neither were able to identify what type of medication was written. Discussed with pt that symptoms started this morning, strep was negative, symptoms did not coinside with a bacterial infection that warranted an abx and she should take OTC medication. Pt may have work note for today.   · Disposition  o Call or Return if symptoms worsen or persist.            Electronically Signed by: CHARLY Cruz -Author on March 21, 2018 12:38:05 PM  "

## 2021-05-07 NOTE — PROGRESS NOTES
Progress Note      Patient Name: Bria Deleon   Patient ID: [id number]   Sex: Female   YOB: 1970        Visit Date: July 27, 2020    Provider: Jonathon Moody DO   Location: [facility]   Location Address: [address] [place] [address] [address] [place]  [address]   Location Phone: [phone]          Chief Complaint     REFILL (BUSPAR)       History Of Present Illness  Bria Deleon is a 50 year old /White female who presents for evaluation and treatment of:      1.) ANXIETY : Per patient - 'I have been out of my medications x several days and I feel as if I'm losing my mind.' Reports 'good' control of anxiety sxs w/ Buspar TID.    2.) PREVENTIVE CARE : Due for labs. Due for mammogram. Most recent cervical cancer screening per gynecology. She will be switching her primary care to a physician in [place] - patient lives in [place].       Past Medical History  Disease Name Date Onset Notes   Anxiety --  --    Depression --  --    Seasonal allergies --  --          Past Surgical History  Procedure Name Date Notes   Sinus Surgery --  --    Tonsillectomy --  --          Medication List  Name Date Started Instructions   biotin oral  --    buspirone 7.5 mg oral tablet 07/27/2020 take 1 tablet by oral route 3 times a day for 60 days   cetirizine 10 mg oral tablet  take 1 tablet (10 mg) by oral route once daily   estradiol 1 mg oral tablet  Take 1 tab in morning, take .5 tab in the evening   Multivitamin 50 Plus oral tablet  take 1 tablet by oral route daily   venlafaxine 150 mg oral capsule,extended release 24hr 03/27/2020 TAKE 1 CAPSULE BY MOUTH ONCE DAILY AT THE SAME TIME IN THE MORNING WITH FOOD   vitamin B complex oral tablet  --    Vitamin C 1,000 mg oral tablet  take 1 tablet by oral route daily         Allergy List  Allergen Name Date Reaction Notes   Antibiotic drug intolerance --  --  01/11/2018 - all antibiotics cause yeast infection   SULFA (SULFONAMIDES) --  --   "--          Family Medical History  Disease Name Relative/Age Notes   Anemia Mother/   Mother   Asthma Mother/   Mother   Seizures Sister/   Sister   Cerebrovascular Accident (CVA) Mother/   Mother         Social History  Finding Status Start/Stop Quantity Notes   Alcohol Former --/-- --  drank alcohol in the past   Recreational Drug Use Never --/-- --  never used   Tobacco Never --/-- --  never smoker         Review of Systems  · Constitutional  o Denies  o : fatigue, night sweats  · Eyes  o Denies  o : double vision, blurred vision  · HENT  o Denies  o : vertigo, recent head injury  · Breasts  o Denies  o : lumps, tenderness, swelling, nipple discharge  · Cardiovascular  o Denies  o : chest pain, irregular heart beats  · Respiratory  o Denies  o : shortness of breath, productive cough  · Gastrointestinal  o Denies  o : nausea, vomiting  · Genitourinary  o Denies  o : dysuria, urinary retention  · Integument  o Denies  o : hair growth change, new skin lesions  · Neurologic  o Denies  o : altered mental status, seizures  · Musculoskeletal  o Denies  o : joint swelling, limitation of motion  · Endocrine  o Denies  o : cold intolerance, heat intolerance  · Psychiatric  o Admits  o : anxiety  o Denies  o : hallucinations, delusions  · Heme-Lymph  o Denies  o : petechiae, lymph node enlargement or tenderness  · Allergic-Immunologic  o Denies  o : frequent illnesses      Vitals  Date Time BP Position Site L\R Cuff Size HR RR TEMP (F) WT  HT  BMI kg/m2 BSA m2 O2 Sat HC       07/27/2020 10:36 AM 99/60 Sitting    112 - R  97.7 202lbs 0oz 5'  5.5\" 33.1 2.06 98 %          Physical Examination  · Constitutional  o Appearance  o : alert, in no acute distress  · Eyes  o Conjunctivae  o : conjunctivae normal  · Neck  o Inspection/Palpation  o : supple  · Respiratory  o Respiratory Effort  o : breathing unlabored  o Auscultation of Lungs  o : clear to ascultation  · Cardiovascular  o Heart  o :   § Auscultation of Heart  § : " regular rate and rhythm  o Peripheral Vascular System  o :   § Extremities  § : no edema  · Skin and Subcutaneous Tissue  o General Inspection  o : no rash appreciated   · Psychiatric  o Mood and Affect  o : mood normal, affect appropriate          Assessment  · Anxiety disorder     300.00/F41.9    A.) Buspar refilled as noted.     · Medication management     V58.69/Z79.899    A.) Labs as noted.     · Screening for diabetes mellitus     V77.1/Z13.1    A.) A1C as noted.     · Screening for thyroid disorder     V77.0/Z13.29    A.) TSH as noted.     · Screening for lipid disorders     V77.91/Z13.220    A.) Lipid panel as noted.     · Screening for breast cancer     V76.10/Z12.39    A.) Mammogram ordered.       Problems Reconciled  Plan  · Orders  o ACO-39: Current medications updated and reviewed () - - 07/27/2020  o CBC with Manual Diff if indicated [facility] ([id number], 32387) - V58.69/Z79.899 - 07/27/2020  o TSH (thyroid stimulating hormone) (46020) - V77.0/Z13.29 - 07/27/2020  o Lipid Panel [facility] ([id number]) - V77.91/Z13.220 - 07/27/2020  o CMP Non-fasting [facility] (12929) - V58.69/Z79.899 - 07/27/2020  o Hgb A1c [facility] ([id number]) - V77.1/Z13.1 - 07/27/2020  o Mammogram breast screening 2D digital bilateral. (36248, ) - V76.10/Z12.39 - 07/27/2020  · Medications  o buspirone 7.5 mg oral tablet   SIG: take 1 tablet by oral route 3 times a day for 60 days   DISP: (180) tablet with 1 refills  Adjusted on 07/27/2020     o Medications have been Reconciled  o Transition of Care or Provider Policy  · Instructions  o Take all medications as prescribed/directed.  o Patient was educated/instructed on their diagnosis, treatment and medications prior to discharge from the clinic today.  o Follow up will be scheduled per labs.             Electronically Signed by: Jonathon Moody DO - on July 27, 2020 12:19:19 PM
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
## 2021-05-09 VITALS
OXYGEN SATURATION: 98 % | SYSTOLIC BLOOD PRESSURE: 120 MMHG | HEIGHT: 66 IN | DIASTOLIC BLOOD PRESSURE: 80 MMHG | WEIGHT: 165 LBS | BODY MASS INDEX: 26.52 KG/M2 | HEART RATE: 125 BPM | TEMPERATURE: 98.6 F

## 2021-05-09 VITALS
HEART RATE: 95 BPM | SYSTOLIC BLOOD PRESSURE: 120 MMHG | WEIGHT: 166 LBS | TEMPERATURE: 97.4 F | OXYGEN SATURATION: 99 % | DIASTOLIC BLOOD PRESSURE: 80 MMHG

## 2021-05-09 VITALS
OXYGEN SATURATION: 97 % | DIASTOLIC BLOOD PRESSURE: 84 MMHG | TEMPERATURE: 97.6 F | WEIGHT: 167 LBS | HEART RATE: 134 BPM | SYSTOLIC BLOOD PRESSURE: 130 MMHG

## 2021-05-09 VITALS
BODY MASS INDEX: 33.66 KG/M2 | DIASTOLIC BLOOD PRESSURE: 60 MMHG | OXYGEN SATURATION: 98 % | TEMPERATURE: 97.7 F | HEIGHT: 65 IN | HEART RATE: 112 BPM | SYSTOLIC BLOOD PRESSURE: 99 MMHG | WEIGHT: 202 LBS

## 2021-05-09 VITALS
SYSTOLIC BLOOD PRESSURE: 124 MMHG | OXYGEN SATURATION: 97 % | TEMPERATURE: 97.4 F | HEART RATE: 117 BPM | DIASTOLIC BLOOD PRESSURE: 78 MMHG | WEIGHT: 187.5 LBS

## 2021-05-09 VITALS
BODY MASS INDEX: 26.44 KG/M2 | WEIGHT: 164.5 LBS | HEART RATE: 105 BPM | DIASTOLIC BLOOD PRESSURE: 68 MMHG | OXYGEN SATURATION: 99 % | SYSTOLIC BLOOD PRESSURE: 110 MMHG | HEIGHT: 66 IN | TEMPERATURE: 98.2 F

## 2021-05-09 VITALS
SYSTOLIC BLOOD PRESSURE: 124 MMHG | DIASTOLIC BLOOD PRESSURE: 78 MMHG | OXYGEN SATURATION: 98 % | TEMPERATURE: 98.4 F | HEART RATE: 113 BPM | WEIGHT: 173 LBS

## 2021-05-09 VITALS
SYSTOLIC BLOOD PRESSURE: 110 MMHG | WEIGHT: 170 LBS | TEMPERATURE: 98.2 F | HEART RATE: 118 BPM | DIASTOLIC BLOOD PRESSURE: 70 MMHG

## 2021-05-09 VITALS
SYSTOLIC BLOOD PRESSURE: 104 MMHG | OXYGEN SATURATION: 96 % | HEART RATE: 111 BPM | TEMPERATURE: 96 F | WEIGHT: 174.25 LBS | DIASTOLIC BLOOD PRESSURE: 64 MMHG

## 2021-05-09 VITALS
SYSTOLIC BLOOD PRESSURE: 120 MMHG | HEART RATE: 110 BPM | TEMPERATURE: 97.9 F | WEIGHT: 160 LBS | HEIGHT: 66 IN | BODY MASS INDEX: 25.71 KG/M2 | DIASTOLIC BLOOD PRESSURE: 80 MMHG | OXYGEN SATURATION: 97 %

## 2021-05-09 VITALS
DIASTOLIC BLOOD PRESSURE: 80 MMHG | OXYGEN SATURATION: 100 % | TEMPERATURE: 98.1 F | SYSTOLIC BLOOD PRESSURE: 120 MMHG | HEART RATE: 125 BPM | WEIGHT: 165 LBS

## 2024-09-19 ENCOUNTER — TRANSCRIBE ORDERS (OUTPATIENT)
Age: 54
End: 2024-09-19

## 2024-09-19 DIAGNOSIS — I83.93 SPIDER VEINS OF BOTH LOWER EXTREMITIES: Primary | ICD-10-CM

## 2025-02-05 ENCOUNTER — OFFICE VISIT (OUTPATIENT)
Age: 55
End: 2025-02-05
Payer: COMMERCIAL

## 2025-02-05 VITALS
TEMPERATURE: 97.1 F | WEIGHT: 200 LBS | DIASTOLIC BLOOD PRESSURE: 80 MMHG | HEART RATE: 81 BPM | SYSTOLIC BLOOD PRESSURE: 120 MMHG | OXYGEN SATURATION: 97 % | HEIGHT: 65 IN | BODY MASS INDEX: 33.32 KG/M2

## 2025-02-05 DIAGNOSIS — I86.8 SPIDER VARICOSE VEINS: Primary | ICD-10-CM

## 2025-02-05 DIAGNOSIS — I87.2 VENOUS (PERIPHERAL) INSUFFICIENCY: ICD-10-CM

## 2025-02-05 RX ORDER — BUSPIRONE HYDROCHLORIDE 30 MG/1
1 TABLET ORAL DAILY
COMMUNITY
Start: 2025-01-18

## 2025-02-05 RX ORDER — CETIRIZINE HYDROCHLORIDE 5 MG/1
10 TABLET ORAL DAILY
COMMUNITY

## 2025-02-05 RX ORDER — NITROFURANTOIN 25; 75 MG/1; MG/1
1 CAPSULE ORAL EVERY 12 HOURS SCHEDULED
COMMUNITY
Start: 2025-01-28

## 2025-02-05 RX ORDER — SERTRALINE HYDROCHLORIDE 100 MG/1
TABLET, FILM COATED ORAL
COMMUNITY
Start: 2025-01-18

## 2025-02-05 RX ORDER — HYDROXYZINE PAMOATE 25 MG/1
CAPSULE ORAL
COMMUNITY
Start: 2024-11-14

## 2025-02-05 RX ORDER — FLUCONAZOLE 150 MG/1
TABLET ORAL
COMMUNITY
Start: 2025-01-22

## 2025-02-05 RX ORDER — GABAPENTIN 100 MG/1
CAPSULE ORAL
COMMUNITY
Start: 2025-01-27

## 2025-02-05 NOTE — PROGRESS NOTES
Patient Name: Bria Deleon    MRN: [id number] Encounter Date: 02/05/2025      Consulting Service: Vascular Surgery    Referring Provider: CHARLY Keys       CHIEF COMPLAINT:  Chief Complaint   Patient presents with    Bilateral Lower Extremity     Spider veins, new patient       Subjective    HPI: Bria Deleon is a 55 y.o. female is being seen for evaluation/management of complaints of spider veins of bilateral lower extremity.   Symptoms include Coello symptoms: Spider veins.  Patient describes the discomfort from the veins as not affecting their daily life.   Patient has positive family history of the varicose veins and negative family history of DVT.  At this point time attempts at symptomatic control using elevation, compression and nonsteroidals have not been been attempted.  Prior prior venous interventions  include  none .    PAST MEDICAL HISTORY: History reviewed. No pertinent past medical history.   PAST SURGICAL HISTORY: History reviewed. No pertinent surgical history.   FAMILY HISTORY: History reviewed. No pertinent family history.   SOCIAL HISTORY:     MEDICATIONS:   Current Outpatient Medications on File Prior to Visit   Medication Sig Dispense Refill    busPIRone (BUSPAR) 30 MG tablet Take 1 tablet by mouth Daily.      cetirizine (zyrTEC) 5 MG tablet Take 2 tablets by mouth Daily.      fluconazole (DIFLUCAN) 150 MG tablet TAKE 1 TABLET BY MOUTH SINGLE DOSE MAY REPEAT IN 3 DAYS IF NEEDED      gabapentin (NEURONTIN) 100 MG capsule TAKE 1 TO 2 CAPSULES BY MOUTH EVERY NIGHT      hydrOXYzine pamoate (VISTARIL) 25 MG capsule TAKE 1 CAPSULE BY MOUTH TWICE A DAY AS NEEDED FOR ANXIETY/PANIC ATTACKS      nitrofurantoin, macrocrystal-monohydrate, (MACROBID) 100 MG capsule Take 1 capsule by mouth Every 12 (Twelve) Hours.      sertraline (ZOLOFT) 100 MG tablet take 2 tablet by mouth every night at bedtime       No current facility-administered medications on file prior to visit.       ALLERGIES:  "Sulfa antibiotics       Objective   Vitals:    25 1156   BP: 120/80   Pulse: 81   Temp: 97.1 °F (36.2 °C)   TempSrc: Temporal   SpO2: 97%   Weight: 90.7 kg (200 lb)   Height: 165.1 cm (65\")     Body mass index is 33.28 kg/m².  BMI is >= 30 and <35. (Class 1 Obesity). The following options were offered after discussion;: weight loss educational material (shared in after visit summary), exercise counseling/recommendations, and Information on healthy weight added to patient's after visit summary.      PHYSICAL EXAM:   Physical Exam  Constitutional:       Appearance: Normal appearance.   HENT:      Head: Normocephalic and atraumatic.      Nose: Nose normal.   Eyes:      Extraocular Movements: Extraocular movements intact.      Pupils: Pupils are equal, round, and reactive to light.   Cardiovascular:      Rate and Rhythm: Normal rate.      Pulses: Normal pulses.      Heart sounds: Normal heart sounds.      Comments: Spider veins anterior posterior thighs and calves mild  Pulmonary:      Effort: Pulmonary effort is normal.      Breath sounds: Normal breath sounds.   Abdominal:      General: Abdomen is flat. Bowel sounds are normal.      Palpations: Abdomen is soft.   Musculoskeletal:         General: Normal range of motion.      Cervical back: Normal range of motion and neck supple.      Right lower le+ Edema present.      Left lower le+ Edema present.   Skin:     General: Skin is warm and dry.   Neurological:      General: No focal deficit present.      Mental Status: She is alert and oriented to person, place, and time. Mental status is at baseline.   Psychiatric:         Mood and Affect: Mood normal.         Thought Content: Thought content normal.          Result Review   LABS:                   Results Review:       I reviewed the patient's new clinical results.    The following radiologic or non-invasive studies have been reviewed by me: none  No results found for this or any previous visit from the " past 365 days.     No radiology results for the last 30 days.                ASSESSMENT/PLAN:   Diagnoses and all orders for this visit:    1. Spider varicose veins (Primary)  -     Sclerotherapy/ Laser Treatment of Superficial Vein/ Brown Spots    2. Venous (peripheral) insufficiency       55 y.o. female with what appears to be asymptomatic but cosmetically bothersome spider veins of both legs.  This Pancho used to wear a lot of skirts and she does not like to do anymore because of the spider veins that are present bilaterally.  Clinically it looks like the left might be slightly worse than the right shin but not in the thigh.  At this point in time I have discussed with her the nature of sclerotherapy the risk benefits complications around and she is wishes to proceed with sclerotherapy.  She is aware of the need to stay out of the sun after treatment and the need to wear compression stockings.  I have given her the name of our knee can to get online for cleaned decreased brown staining issues.  Will get her scheduled and see her back for treatment at her convenience    I discussed the plan with the patient who is agreeable to the plan of care at this point. Thank you for this consult.   Follow Up  Return in about 3 months (around 5/5/2025).    Yoni Coello MD   02/05/25

## 2025-05-07 ENCOUNTER — PROCEDURE VISIT (OUTPATIENT)
Age: 55
End: 2025-05-07

## 2025-05-07 VITALS — SYSTOLIC BLOOD PRESSURE: 105 MMHG | HEIGHT: 65 IN | DIASTOLIC BLOOD PRESSURE: 70 MMHG | BODY MASS INDEX: 33.32 KG/M2

## 2025-05-07 DIAGNOSIS — I87.2 VENOUS (PERIPHERAL) INSUFFICIENCY: ICD-10-CM

## 2025-05-07 DIAGNOSIS — I86.8 SPIDER VARICOSE VEINS: Primary | ICD-10-CM

## 2025-05-07 DIAGNOSIS — G44.019 EPISODIC CLUSTER HEADACHE, NOT INTRACTABLE: ICD-10-CM

## 2025-05-07 DIAGNOSIS — I87.323 CHRONIC VENOUS HYPERTENSION WITH INFLAMMATION INVOLVING BOTH SIDES: ICD-10-CM

## 2025-05-07 PROBLEM — R51.9 HEAD ACHE: Status: ACTIVE | Noted: 2025-05-07

## 2025-05-07 NOTE — PROGRESS NOTES
"Progress Note    Date: 2024  Time In: 11:01  Time Out: 11:35    Patient Legal Name: Alec Medrano  Patient Age: 54 y.o.    Mode of visit: Video  Location of provider: [address], [place] [address]  Location of patient: His sister's home ([place])    [facility] Patient is being seen via telehealth and patient confirms that they are in a secure environment for this session. The patient's condition being diagnosed/treated is appropriate for telemedicine. The provider identified herself as well as her credentials. The patient gave consent to be seen remotely, and when consent is given they understand that the consent allows for patient identifiable information to be sent to a third party as needed. They may refuse to be seen remotely at any time. The electronic data is encrypted and password protected, and the patient has been advised of the potential risks to privacy not withstanding such measures. Patient consented to the use of video for the purpose of a telehealth session today. The visit included audio and video interaction. Some technical issues occurred during this visit as patient lost connection for a couple of minutes at the beginning of call and had to reconnect.    CHIEF COMPLAINT: Intellectual disability, schizoaffective d/o    Subjective   History of Present Illness   Alec is a 54 y.o. male who presents today as a follow-up for continued psychotherapy. This was a monthly check in with patient to follow up on possible recurrence of psychosis and day to day functioning. Patient denied any delusions and reported he is \"doing good.\" Patient advised he is a little nervous about his upcoming spinal tap. \"They put me to sleep for it.\" He advised his nephew  by suicide yesterday and that has made him sad, also he has had a couple of nights with difficulty falling to sleep. Patient spoke about his sister's dog, reported he has been watching movies and The " Patient Name: Bria Deleon    MRN: [id number] Encounter Date: 05/07/2025      Consulting Service: Vascular Surgery    Referring Provider: No ref. provider found       CHIEF COMPLAINT:  Chief Complaint   Patient presents with    Spider varicose veins     sclero       Subjective    HPI: Bria Deleon is a 55 y.o. female is being seen for evaluation/management of vein intervention.  Patient presents today for long-term follow-up of initially planned sclerotherapy but after discussion she is having new symptoms of pain and discomfort and wishes to proceed with a medical workup instead of the sclerotherapy procedure .  The patient's symptoms have not improved status post procedure.  Current varicose veins have persisted.  Patient complains of issues such as pain ache throbbing and itching now.  This is affecting her daily life.  Based on current condition it appears the patient is in need of further intervention.    PAST MEDICAL HISTORY: History reviewed. No pertinent past medical history.   PAST SURGICAL HISTORY: History reviewed. No pertinent surgical history.   FAMILY HISTORY: History reviewed. No pertinent family history.   SOCIAL HISTORY:   Social History     Tobacco Use    Smoking status: Never     Passive exposure: Never    Smokeless tobacco: Never      MEDICATIONS:   Current Outpatient Medications on File Prior to Visit   Medication Sig Dispense Refill    busPIRone (BUSPAR) 30 MG tablet Take 1 tablet by mouth Daily.      gabapentin (NEURONTIN) 100 MG capsule TAKE 1 TO 2 CAPSULES BY MOUTH EVERY NIGHT      hydrOXYzine pamoate (VISTARIL) 25 MG capsule TAKE 1 CAPSULE BY MOUTH TWICE A DAY AS NEEDED FOR ANXIETY/PANIC ATTACKS      nitrofurantoin, macrocrystal-monohydrate, (MACROBID) 100 MG capsule Take 1 capsule by mouth Every 12 (Twelve) Hours.      sertraline (ZOLOFT) 100 MG tablet take 2 tablet by mouth every night at bedtime      cetirizine (zyrTEC) 5 MG tablet Take 2 tablets by mouth Daily. (Patient not  "taking: Reported on 5/7/2025)      fluconazole (DIFLUCAN) 150 MG tablet TAKE 1 TABLET BY MOUTH SINGLE DOSE MAY REPEAT IN 3 DAYS IF NEEDED (Patient not taking: Reported on 5/7/2025)       No current facility-administered medications on file prior to visit.       ALLERGIES: Sulfa antibiotics       Objective   Vitals:    05/07/25 0950   BP: 105/70   BP Location: Left arm   Height: 165 cm (64.96\")     Body mass index is 33.32 kg/m².          PHYSICAL EXAM:   Physical Exam  Constitutional:       Appearance: Normal appearance.   HENT:      Head: Normocephalic and atraumatic.      Nose: Nose normal.   Eyes:      Extraocular Movements: Extraocular movements intact.      Pupils: Pupils are equal, round, and reactive to light.   Cardiovascular:      Rate and Rhythm: Normal rate.      Pulses: Normal pulses.      Heart sounds: Normal heart sounds.      Comments: You have vein spider veins and some new varicosities with inflammation bilaterally left worse than right  Pulmonary:      Effort: Pulmonary effort is normal.      Breath sounds: Normal breath sounds.   Abdominal:      General: Abdomen is flat. Bowel sounds are normal.      Palpations: Abdomen is soft.   Musculoskeletal:         General: Normal range of motion.      Cervical back: Normal range of motion and neck supple.      Right lower leg: Edema present.      Left lower leg: Edema present.   Skin:     General: Skin is warm and dry.   Neurological:      General: No focal deficit present.      Mental Status: She is alert and oriented to person, place, and time. Mental status is at baseline.   Psychiatric:         Mood and Affect: Mood normal.         Thought Content: Thought content normal.          Result Review   LABS:                   Results Review:       I reviewed the patient's new clinical results.    The following radiologic or non-invasive studies have been reviewed by me: Patient will need class I mapping is bilaterally  No results found for this or any " Walking [place], and that he would like to take a vacation to [place]. Spoke briefly with [place] and she confirmed that patient has been doing well. Patient is voluntarily requesting to participate in outpatient therapy at [facility].      From previous progress note on 12/11/23:  Psychiatric History:  Diagnoses: ID, sisters report other diagnoses haven't been rendered  Outpatient history: around 1 month ago with [facility]  Inpatient history: [facility]  Medication trials: risperidone (tremors, drooling, overly sedated), duloxetine, trazodone  Other treatment modalities: has done therapy  Presenting regimen:  mg BID (recently tried to taper and began exhibiting sexual gestures)  Self harm: denies  Suicide attempts: denies    Assessment    Mental Status Exam     Appearance: appeared to have good hygiene  Behavior: calm  Cooperation:  engaged, cooperative, attentive, and friendly  Eye Contact:  good  Affect:  congruent  Mood: expressive  Speech: responsive, provided no spontaneous conversation  Thought Process:  organized  Thought Content: appropriate  Suicidal: denies  Homicidal:  denies  Hallucinations:  denies  Memory:  intact  Orientation:  person, place, time, and situation  Reliability:  reliable  Insight:  limited  Judgment:  impaired    Clinical Intervention       ICD-10-CM ICD-9-CM   1. Intellectual disability  F79 319   2. Schizoaffective disorder, bipolar type  F25.0 295.70        Individual psychotherapy was provided utilizing CBT and person-centered techniques to provide symptom relief, build rapport, encourage expression of thoughts and feelings, support self-esteem, establish new coping skills, identify goals for treatment, assess symptoms, and gather history.  Therapist utilized open-ended questions to encourage the development of a positive therapeutic relationship and open communication.  Therapist normalized/validated patient’s thoughts and feelings as appropriate. Therapist  previous visit from the past 365 days.     No radiology results for the last 30 days.                ASSESSMENT/PLAN:   Diagnoses and all orders for this visit:    1. Spider varicose veins (Primary)  -     Venous w Reflux Lower Extremity - Bilateral CAR; Future    2. Venous (peripheral) insufficiency  -     Venous w Reflux Lower Extremity - Bilateral CAR; Future    3. Chronic venous hypertension with inflammation involving both sides  -     Venous w Reflux Lower Extremity - Bilateral CAR; Future    4. Episodic cluster headache, not intractable       55 y.o. female with veins that are now become symptomatic with inflammation and itching discomfort.  She was initially just concerned about spider veins but now wishes a full medical workup which I think is appropriate we will pursue a bilateral class I mapping at her earliest convenience and see how she does.  She is already been wearing thigh-high compression and will continue to do so and we have given her new pair today.  See what her study shows and what we have to offer.  In the interim she is decided against sclerotherapy from a cost standpoint now and will will call back when she is ready to reschedule that.    I discussed the plan with the patient who is agreeable to the plan of care at this point. Thank you for this consult.   Follow Up  Return in about 3 months (around 8/7/2025), or if symptoms worsen or fail to improve.    Yoni Coello MD   05/07/25     recommended stopping screen time an hour before bed to help with difficulty falling to sleep. Therapist recommended going for walks and doing puzzles to change up patient's daily routine of mostly watching TV.       Plan   Plan & Goals     Continue building rapport and check in on patient's progress with changing up daily routine.Provided Kimberlyn Mcfarland contact information in patient instruction section.    Patient acknowledged and verbally consented to continue working toward resolving current treatment plan goals and was educated on the importance of participation in the therapeutic process.  Patient will remain compliant with medication regimen as prescribed. Discuss any medication side effects, questions or concerns with prescribing provider.  Call 911 or present to the nearest emergency room in an emergency situation.   National Suicide Prevention Lifeline: Call 988. The Lifeline provides 24/7, free and confidential support for people in distress, prevention and crisis resources.  Crisis Text Line  Text HOME To 574528    Return in about 1 month (around 2/25/2024).    ____________________  This document has been electronically signed by Rosaline Morales LCSW  January 25, 2024 12:50 EST    Part of this note may be an electronic transcription/translation of spoken language to printed text using the Dragon Dictation System.